# Patient Record
Sex: MALE | Race: OTHER
[De-identification: names, ages, dates, MRNs, and addresses within clinical notes are randomized per-mention and may not be internally consistent; named-entity substitution may affect disease eponyms.]

---

## 2018-03-26 ENCOUNTER — HOSPITAL ENCOUNTER (OUTPATIENT)
Dept: HOSPITAL 31 - C.ER | Age: 58
Setting detail: OBSERVATION
LOS: 1 days | Discharge: HOME | End: 2018-03-27
Attending: FAMILY MEDICINE | Admitting: FAMILY MEDICINE
Payer: COMMERCIAL

## 2018-03-26 VITALS — BODY MASS INDEX: 39.8 KG/M2

## 2018-03-26 DIAGNOSIS — Z82.49: ICD-10-CM

## 2018-03-26 DIAGNOSIS — Z87.891: ICD-10-CM

## 2018-03-26 DIAGNOSIS — Z79.4: ICD-10-CM

## 2018-03-26 DIAGNOSIS — Z86.711: ICD-10-CM

## 2018-03-26 DIAGNOSIS — Z79.82: ICD-10-CM

## 2018-03-26 DIAGNOSIS — E11.9: ICD-10-CM

## 2018-03-26 DIAGNOSIS — I10: ICD-10-CM

## 2018-03-26 DIAGNOSIS — I82.531: Primary | ICD-10-CM

## 2018-03-26 DIAGNOSIS — Z79.01: ICD-10-CM

## 2018-03-26 DIAGNOSIS — E78.00: ICD-10-CM

## 2018-03-26 LAB
ALBUMIN SERPL-MCNC: 3.7 G/DL (ref 3.5–5)
ALBUMIN/GLOB SERPL: 1.3 {RATIO} (ref 1–2.1)
ALT SERPL-CCNC: 57 U/L (ref 21–72)
APTT BLD: 29 SECONDS (ref 21–34)
AST SERPL-CCNC: 27 U/L (ref 17–59)
BASOPHILS # BLD AUTO: 0.1 K/UL (ref 0–0.2)
BASOPHILS NFR BLD: 0.8 % (ref 0–2)
BNP SERPL-MCNC: 33 PG/ML (ref 0–900)
BUN SERPL-MCNC: 11 MG/DL (ref 9–20)
CALCIUM SERPL-MCNC: 8.1 MG/DL (ref 8.6–10.4)
D DIMER: < 200 NG/MLDDU (ref 0–243)
EOSINOPHIL # BLD AUTO: 0.1 K/UL (ref 0–0.7)
EOSINOPHIL NFR BLD: 1.5 % (ref 0–4)
ERYTHROCYTE [DISTWIDTH] IN BLOOD BY AUTOMATED COUNT: 13.8 % (ref 11.5–14.5)
GFR NON-AFRICAN AMERICAN: > 60
HGB BLD-MCNC: 12.8 G/DL (ref 12–18)
INR PPP: 1
LYMPHOCYTES # BLD AUTO: 2 K/UL (ref 1–4.3)
LYMPHOCYTES NFR BLD AUTO: 28.2 % (ref 20–40)
MCH RBC QN AUTO: 28.3 PG (ref 27–31)
MCHC RBC AUTO-ENTMCNC: 33.3 G/DL (ref 33–37)
MCV RBC AUTO: 84.9 FL (ref 80–94)
MONOCYTES # BLD: 0.5 K/UL (ref 0–0.8)
MONOCYTES NFR BLD: 7.2 % (ref 0–10)
NEUTROPHILS # BLD: 4.4 K/UL (ref 1.8–7)
NEUTROPHILS NFR BLD AUTO: 62.3 % (ref 50–75)
NRBC BLD AUTO-RTO: 0 % (ref 0–2)
PLATELET # BLD: 224 K/UL (ref 130–400)
PMV BLD AUTO: 7.3 FL (ref 7.2–11.7)
PROTHROMBIN TIME: 10.6 SECONDS (ref 9.7–12.2)
RBC # BLD AUTO: 4.53 MIL/UL (ref 4.4–5.9)
WBC # BLD AUTO: 7.1 K/UL (ref 4.8–10.8)

## 2018-03-26 PROCEDURE — 82948 REAGENT STRIP/BLOOD GLUCOSE: CPT

## 2018-03-26 PROCEDURE — 85378 FIBRIN DEGRADE SEMIQUANT: CPT

## 2018-03-26 PROCEDURE — 85025 COMPLETE CBC W/AUTO DIFF WBC: CPT

## 2018-03-26 PROCEDURE — 71275 CT ANGIOGRAPHY CHEST: CPT

## 2018-03-26 PROCEDURE — 84484 ASSAY OF TROPONIN QUANT: CPT

## 2018-03-26 PROCEDURE — 84100 ASSAY OF PHOSPHORUS: CPT

## 2018-03-26 PROCEDURE — 93306 TTE W/DOPPLER COMPLETE: CPT

## 2018-03-26 PROCEDURE — 83880 ASSAY OF NATRIURETIC PEPTIDE: CPT

## 2018-03-26 PROCEDURE — 83735 ASSAY OF MAGNESIUM: CPT

## 2018-03-26 PROCEDURE — 85730 THROMBOPLASTIN TIME PARTIAL: CPT

## 2018-03-26 PROCEDURE — 80053 COMPREHEN METABOLIC PANEL: CPT

## 2018-03-26 PROCEDURE — 93970 EXTREMITY STUDY: CPT

## 2018-03-26 PROCEDURE — 83036 HEMOGLOBIN GLYCOSYLATED A1C: CPT

## 2018-03-26 PROCEDURE — 80061 LIPID PANEL: CPT

## 2018-03-26 PROCEDURE — 96372 THER/PROPH/DIAG INJ SC/IM: CPT

## 2018-03-26 PROCEDURE — 85610 PROTHROMBIN TIME: CPT

## 2018-03-26 NOTE — C.PDOC
History Of Present Illness


Pt c/o right upper back pain which he describes as "lung pain". Pt was sent in 

by his PMD with a note stating to admit pt for recurrent PE and DVTs and to 

contact Dr. ABIGAIL Martinez. 


Time Seen by Provider: 18 18:07


Chief Complaint (Nursing): Shortness Of Breath


History Per: Patient


Onset/Duration Of Symptoms: Days


Current Symptoms Are (Timing): Worse


Severity: Moderate


Associated Symptoms: Leg/Calf Pain, Ankle/Leg Swelling


Reports Recently: Treated By A Physician


Additional History Per: Prior Records





Past Medical History


Reviewed: Historical Data, Nursing Documentation, Vital Signs


Vital Signs: 





 Last Vital Signs











Temp  98.3 F   18 21:


 


Pulse  80   18 21:26


 


Resp  22   18 21:26


 


BP  121/59 L  18 21:


 


Pulse Ox  97   18 21:26














- Medical History


PMH: Diabetes, Deep Vein Thrombosis, HTN, Hypercholesterolemia, Pulmonary 

Embolism


Family History: States: Unknown Family Hx





- Social History


Hx Alcohol Use: No


Hx Substance Use: No





- Immunization History


Hx Tetanus Toxoid Vaccination: Yes


Hx Influenza Vaccination: Yes


Hx Pneumococcal Vaccination: No





Review Of Systems


Except As Marked, All Systems Reviewed And Found Negative.


Constitutional: Negative for: Fever


Cardiovascular: Positive for: Edema


Respiratory: Positive for: Shortness of Breath


Gastrointestinal: Negative for: Vomiting, Abdominal Pain


Musculoskeletal: Positive for: Back Pain, Leg Pain.  Negative for: Neck Pain


Neurological: Negative for: Weakness, Seizures, Altered Mental Status





Physical Exam





- Physical Exam


Appears: Non-toxic, No Acute Distress


Skin: Normal Color, Warm, Dry, No Rash


Head: Atraumatic, Normacephalic


Eye(s): bilateral: Normal Inspection, PERRL, EOMI


Neck: Normal ROM, Supple


Cardiovascular: Rhythm Regular


Respiratory: Normal Breath Sounds, No Accessory Muscle Use


Gastrointestinal/Abdominal: Soft, No Tenderness


Extremity: Normal ROM, Calf Tenderness, Swelling (R>L)


Pulses: Left Dorsalis Pedis: Normal, Right Dorsalis Pedis: Normal


Neurological/Psych: Oriented x3, Normal Motor, Normal Sensation





ED Course And Treatment





- Laboratory Results


Result Diagrams: 


 18 19:04





 18 19:04


Lab Interpretation: No Acute Changes


ECG: Interpreted By Me, Viewed By Me


ECG Rhythm: Sinus Rhythm, Nonspecific Changes


ECG Interpretation: No Acute Changes


Rate From EC


O2 Sat by Pulse Oximetry: 94 (on RA)


Pulse Ox Interpretation: Other (Borderline)





- CT Scan/US


  ** CTA of chest


Other Rad Studies (CT/US): Read By Radiologist, Radiology Report Reviewed


CT/US Interpretation: FINDINGS:  Limitations:  Suboptimal timing of bolus.  

Pulmonary arteries:  Chronic linear filling defects within lobar branches,.  

stable.  No definite acute pulmonary embolism.  Aorta:  No aneurysm.  No 

dissection.  Lungs:  No consolidation.  Few calcified granulomas.  Pleural space

:  No significant effusion.  No pneumothorax.  Heart:  Mild cardiomegaly.  No 

significant pericardial effusion.  Coronary.  artery calcifications.  Bones/

joints:  Multilevel anterior and lateral osteophytes.  No acute.  fracture.  

Soft tissues:  Unremarkable.  Lymph nodes:  No pathologically enlarged lymph 

nodes.  Liver:  Fatty infiltration.  Kidneys and ureters:  Few probable LEFT 

renal cysts.





- Physician Consult Information


Physician Contacted: Jose Martinez


Outcome Of Conversation: He wants pt admitted on Hospitalist service and he 

will consult.





Medical Decision Making


Medical Decision Making: 


Patient may benefit from IVC filter placement. 





Disposition


Discussed With : Viktor Amos


Comment: He accepted pt on hospitalist service.


Doctor Will See Patient In The: Hospital


Counseled Patient/Family Regarding: Studies Performed, Diagnosis





- Disposition


Disposition: HOSPITALIZED


Disposition Time: 22:44


Condition: FAIR





- Clinical Impression


Clinical Impression: 


 DVT (deep venous thrombosis)

## 2018-03-26 NOTE — CP.PCM.HP
<WillamJustina SFelix - Last Filed: 03/27/18 02:57>





History of Present Illness





- History of Present Illness


History of Present Illness: 





CC:"SOB"





HPI: 57 year old male with past medical history of HTN, DM type II, HLD, blood 

clots in the lung presents to the ER for shortness of breath.  Patient states 

he has been SOB for the past 3 days associated with pain in his lungs on his 

back.  He also states his lower extremities have been swollen for the past 18 

days and he has had some numbness on his right thigh.  He also states he has 

had a cough for the past 2 days with yellow sputum.  He states he recently 

returned from [place] about 7 days ago.  He states while he was there he was able 

to walk around without feeling short of breath at all.   He denies chest pain, 

palpitations, nausea, vomiting, fever, diarrhea or constipation.





PMD: William Levin


Past Medical History: HTN, DM type II, HLD, blood clots in the lung (diagnosed 

in January) 


Surgical History: denies


Medications: Lantus 80 units q12h; Amlodipine 5mg daily; Metoprolol XL 50mg 

daily; Aspirin 81mg daily; Enalipril 10mg daily; Enoxaprin 140mg q12h; Ventolin 


Allergies: NKDA


Family History: Mom passed of MI age 64; Dad passed of MI age 60s


Social History: quit smoking 25 years ago; smoked for 14 years about 1 pack/day

; denies alcohol or illicit drug use 


 





Present on Admission





- Present on Admission


Any Indicators Present on Admission: Yes


History of DVT/PE: Yes





Review of Systems





- Constitutional


Constitutional: absent: Chills, Fever





- EENT


Eyes: absent: Blurred Vision





- Cardiovascular


Cardiovascular: Dyspnea.  absent: Chest Pain, Palpitations





- Respiratory


Respiratory: Cough, Dyspnea, Change in Mucous Color





- Gastrointestinal


Gastrointestinal: absent: Constipation, Diarrhea, Nausea, Vomiting





- Genitourinary


Genitourinary: absent: Dysuria





- Neurological


Neurological: absent: Dizziness, Headaches





Past Patient History





- Past Social History


Smoking Status: Never Smoked





- CARDIAC


Hx Hypercholesterolemia: Yes


Hx Hypertension: Yes





- PULMONARY


Hx Pulmonary Embolism: Yes





- PSYCHIATRIC


Hx Substance Use: No





- SURGICAL HISTORY


Hx Surgeries: No





- ANESTHESIA


Hx Anesthesia: No





Meds


Allergies/Adverse Reactions: 


 Allergies











Allergy/AdvReac Type Severity Reaction Status Date / Time


 


No Known Allergies Allergy   Verified 02/23/17 10:05














Physical Exam





- Constitutional


Appears: No Acute Distress





- Head Exam


Head Exam: ATRAUMATIC, NORMAL INSPECTION





- Eye Exam


Eye Exam: EOMI, Normal appearance





- ENT Exam


ENT Exam: Mucous Membranes Moist





- Respiratory Exam


Respiratory Exam: Clear to Auscultation Bilateral, NORMAL BREATHING PATTERN.  

absent: Rales, Rhonchi, Wheezes, Stridor





- Cardiovascular Exam


Cardiovascular Exam: REGULAR RHYTHM, RRR, +S1, +S2.  absent: JVD





- GI/Abdominal Exam


GI & Abdominal Exam: Normal Bowel Sounds, Soft.  absent: Tenderness





- Extremities Exam


Extremities exam: Positive for: pedal edema (+2 bilateral lower extremity pedal 

edema ), pedal pulses present.  Negative for: calf tenderness, tenderness





- Neurological Exam


Neurological exam: Alert, CN II-XII Intact, Oriented x3





- Expanded Neurological Exam


  ** Expanded


Patient oriented to: person, place, time


Sensory exam: Lower Extremity Light Touch: Normal, Upper Extremity Light Touch: 

Normal


Neuro motor strength exam: Left Upper Extremity: 5, Right Upper Extremity: 5, 

Left Lower Extremity: 5, Right Lower Extremity: 5


Coma Scale Eye Opening: SPONTANEOUS


Coma Scale Motor Response: OBEYS COMMANDS





- Psychiatric Exam


Psychiatric exam: Normal Affect, Normal Mood





- Skin


Skin Exam: Dry, Intact, Normal Color, Warm





Results





- Vital Signs


Recent Vital Signs: 





 Last Vital Signs











Temp  98.3 F   03/26/18 21:26


 


Pulse  80   03/26/18 21:26


 


Resp  22   03/26/18 21:26


 


BP  121/59 L  03/26/18 21:26


 


Pulse Ox  94 L  03/26/18 22:45














- Labs


Result Diagrams: 


 03/26/18 19:04





 03/26/18 19:04


Labs: 





 Laboratory Results - last 24 hr











  03/26/18 03/26/18 03/26/18





  19:04 19:04 19:04


 


WBC  7.1  


 


RBC  4.53  


 


Hgb  12.8  D  


 


Hct  38.5  


 


MCV  84.9  D  


 


MCH  28.3  


 


MCHC  33.3  


 


RDW  13.8  


 


Plt Count  224  


 


MPV  7.3  


 


Neut % (Auto)  62.3  


 


Lymph % (Auto)  28.2  


 


Mono % (Auto)  7.2  


 


Eos % (Auto)  1.5  


 


Baso % (Auto)  0.8  


 


Neut # (Auto)  4.4  


 


Lymph # (Auto)  2.0  


 


Mono # (Auto)  0.5  


 


Eos # (Auto)  0.1  


 


Baso # (Auto)  0.1  


 


PT   10.6 


 


INR   1.0 


 


APTT   29 


 


D-Dimer, Quantitative   < 200 


 


Sodium    141


 


Potassium    3.9


 


Chloride    102


 


Carbon Dioxide    28


 


Anion Gap    15


 


BUN    11


 


Creatinine    0.9


 


Est GFR ( Amer)    > 60


 


Est GFR (Non-Af Amer)    > 60


 


Random Glucose    212 H


 


Calcium    8.1 L


 


Total Bilirubin    0.3


 


AST    27


 


ALT    57


 


Alkaline Phosphatase    61


 


Troponin I    < 0.0120


 


NT-Pro-B Natriuret Pep    33.0


 


Total Protein    6.6


 


Albumin    3.7


 


Globulin    2.9


 


Albumin/Globulin Ratio    1.3














Assessment & Plan





- Assessment and Plan (Free Text)


Assessment: 





1.) Shortness of breath


- Chest CT:  No definite CT evidence of acute pulmonary embolism. 


- f/u ECHO


- EKG: NSR


- Trop negative x2


- Cardiology Consult: Dr. Martinez --> help appreciated 


- Continue Ventolin prn 





2.) LE swelling


- f/u LE dopplers





3.) History of blood clots in lung


- Chest CT:  No definite CT evidence of acute pulmonary embolism. 


- Enoxaprin 140mg SC q12h





4.) History of HTN


- continue Enalapril 10mg daily


- continue Amlodipine 5mg daily 


- Continue Metoprolol 50mg daily 


- Aspirin 81mg daily


- ECHO (2/17): EF 65%


   - f/u repeat ECHO





5.) History of HLD 


- Crestor 10mg daily


- f/u lipid panel





6.) History of DM


- ACCUchecks


- ISS


- hypoglycemia protocol


- f/u hA1c





7.) Prophylaxis


- SCDs contraindication due to swelling


- pepcid 20mg daily 


- Enoxaprin 140mg SC q12h





Case Discussed with Dr. Bette Quarles PGY-1 








<Viktor Amos - Last Filed: 03/27/18 06:19>





Results





- Vital Signs


Recent Vital Signs: 





 Last Vital Signs











Temp  98.8 F   03/27/18 03:15


 


Pulse  66   03/27/18 03:15


 


Resp  20   03/27/18 03:15


 


BP  119/62   03/27/18 03:15


 


Pulse Ox  99   03/27/18 03:15














- Labs


Result Diagrams: 


 03/26/18 19:04





 03/26/18 19:04


Labs: 





 Laboratory Results - last 24 hr











  03/26/18 03/26/18 03/26/18





  19:04 19:04 19:04


 


WBC  7.1  


 


RBC  4.53  


 


Hgb  12.8  D  


 


Hct  38.5  


 


MCV  84.9  D  


 


MCH  28.3  


 


MCHC  33.3  


 


RDW  13.8  


 


Plt Count  224  


 


MPV  7.3  


 


Neut % (Auto)  62.3  


 


Lymph % (Auto)  28.2  


 


Mono % (Auto)  7.2  


 


Eos % (Auto)  1.5  


 


Baso % (Auto)  0.8  


 


Neut # (Auto)  4.4  


 


Lymph # (Auto)  2.0  


 


Mono # (Auto)  0.5  


 


Eos # (Auto)  0.1  


 


Baso # (Auto)  0.1  


 


PT   10.6 


 


INR   1.0 


 


APTT   29 


 


D-Dimer, Quantitative   < 200 


 


Sodium    141


 


Potassium    3.9


 


Chloride    102


 


Carbon Dioxide    28


 


Anion Gap    15


 


BUN    11


 


Creatinine    0.9


 


Est GFR ( Amer)    > 60


 


Est GFR (Non-Af Amer)    > 60


 


POC Glucose (mg/dL)   


 


Random Glucose    212 H


 


Calcium    8.1 L


 


Total Bilirubin    0.3


 


AST    27


 


ALT    57


 


Alkaline Phosphatase    61


 


Troponin I    < 0.0120


 


NT-Pro-B Natriuret Pep    33.0


 


Total Protein    6.6


 


Albumin    3.7


 


Globulin    2.9


 


Albumin/Globulin Ratio    1.3














  03/27/18 03/27/18





  00:55 02:04


 


WBC  


 


RBC  


 


Hgb  


 


Hct  


 


MCV  


 


MCH  


 


MCHC  


 


RDW  


 


Plt Count  


 


MPV  


 


Neut % (Auto)  


 


Lymph % (Auto)  


 


Mono % (Auto)  


 


Eos % (Auto)  


 


Baso % (Auto)  


 


Neut # (Auto)  


 


Lymph # (Auto)  


 


Mono # (Auto)  


 


Eos # (Auto)  


 


Baso # (Auto)  


 


PT  


 


INR  


 


APTT  


 


D-Dimer, Quantitative  


 


Sodium  


 


Potassium  


 


Chloride  


 


Carbon Dioxide  


 


Anion Gap  


 


BUN  


 


Creatinine  


 


Est GFR ( Amer)  


 


Est GFR (Non-Af Amer)  


 


POC Glucose (mg/dL)  227 H 


 


Random Glucose  


 


Calcium  


 


Total Bilirubin  


 


AST  


 


ALT  


 


Alkaline Phosphatase  


 


Troponin I   < 0.0120


 


NT-Pro-B Natriuret Pep  


 


Total Protein  


 


Albumin  


 


Globulin  


 


Albumin/Globulin Ratio  














Assessment & Plan





- Date & Time


Date: 03/27/18 (I have seen and examined the patient.  I agree with the 

findings and plan of care as documented by Dr. Quarles.  Patient with SOB.  

History of PEs.  Now with leg swelling.  Check lower extremity dopplers.  

Lovenox therapeutic dose.  2D Echo.  Oxygen as needed.  Monitor for acute 

changes.)


Time: 06:18





Attending/Attestation





- Attestation


I have personally seen and examined this patient.: Yes


I have fully participated in the care of the patient.: Yes


I have reviewed all pertinent clinical information: Yes

## 2018-03-26 NOTE — CT
EXAM:

  CT Angiography Chest With Intravenous Contrast



CLINICAL HISTORY:

  57 years old, male; Pain; Chest pain; Left-sided chest pain; Additional info: 

Right upper back pain, h/o pe



TECHNIQUE:

  Axial computed tomographic angiography images of the chest with intravenous 

contrast using pulmonary embolism protocol.  All CT scans at this facility use 

one or more dose reduction techniques, viz.: automated exposure control; ma/kV 

adjustment per patient size (including targeted exams where dose is matched to 

indication; i.e. head); or iterative reconstruction technique.

  MIP reconstructed images were created and reviewed.

  Coronal and sagittal reformatted images were created and reviewed.



CONTRAST:

  100 mL of visipaque 320 administered intravenously.



COMPARISON:

  CT - ANGIO CHEST PE PROTOCOL 2018-02-20 15:19



FINDINGS:

  Limitations:  Suboptimal timing of bolus.

  Pulmonary arteries:  Chronic linear filling defects within lobar branches, 

stable.  No definite acute pulmonary embolism.

  Aorta:  No aneurysm.  No dissection.

  Lungs:  No consolidation.  Few calcified granulomas.

  Pleural space:  No significant effusion.  No pneumothorax.

  Heart:  Mild cardiomegaly.  No significant pericardial effusion.  Coronary 

artery calcifications.

  Bones/joints:  Multilevel anterior and lateral osteophytes.  No acute 

fracture.

  Soft tissues:  Unremarkable.

  Lymph nodes:  No pathologically enlarged lymph nodes.

  Liver:  Fatty infiltration.

  Kidneys and ureters:  Few probable LEFT renal cysts.



IMPRESSION:     

1.  No definite CT evidence of acute pulmonary embolism.

2.  Incidental/non-acute findings are described above.

## 2018-03-27 VITALS
OXYGEN SATURATION: 96 % | HEART RATE: 73 BPM | DIASTOLIC BLOOD PRESSURE: 81 MMHG | SYSTOLIC BLOOD PRESSURE: 150 MMHG | RESPIRATION RATE: 20 BRPM

## 2018-03-27 VITALS — TEMPERATURE: 98 F

## 2018-03-27 LAB
ALBUMIN SERPL-MCNC: 3.5 G/DL (ref 3.5–5)
ALBUMIN/GLOB SERPL: 1.3 {RATIO} (ref 1–2.1)
ALT SERPL-CCNC: 52 U/L (ref 21–72)
AST SERPL-CCNC: 27 U/L (ref 17–59)
BASOPHILS # BLD AUTO: 0 K/UL (ref 0–0.2)
BASOPHILS NFR BLD: 0.7 % (ref 0–2)
BUN SERPL-MCNC: 14 MG/DL (ref 9–20)
CALCIUM SERPL-MCNC: 8.2 MG/DL (ref 8.6–10.4)
EOSINOPHIL # BLD AUTO: 0.2 K/UL (ref 0–0.7)
EOSINOPHIL NFR BLD: 2.4 % (ref 0–4)
ERYTHROCYTE [DISTWIDTH] IN BLOOD BY AUTOMATED COUNT: 14 % (ref 11.5–14.5)
GFR NON-AFRICAN AMERICAN: > 60
HDLC SERPL-MCNC: 27 MG/DL (ref 30–70)
HGB BLD-MCNC: 12.7 G/DL (ref 12–18)
LDLC SERPL-MCNC: 100 MG/DL (ref 0–129)
LYMPHOCYTES # BLD AUTO: 1.4 K/UL (ref 1–4.3)
LYMPHOCYTES NFR BLD AUTO: 20.2 % (ref 20–40)
MCH RBC QN AUTO: 29 PG (ref 27–31)
MCHC RBC AUTO-ENTMCNC: 33.6 G/DL (ref 33–37)
MCV RBC AUTO: 86.3 FL (ref 80–94)
MONOCYTES # BLD: 0.6 K/UL (ref 0–0.8)
MONOCYTES NFR BLD: 9.6 % (ref 0–10)
NEUTROPHILS # BLD: 4.6 K/UL (ref 1.8–7)
NEUTROPHILS NFR BLD AUTO: 67.1 % (ref 50–75)
NRBC BLD AUTO-RTO: 0.1 % (ref 0–2)
PLATELET # BLD: 199 K/UL (ref 130–400)
PMV BLD AUTO: 7.6 FL (ref 7.2–11.7)
RBC # BLD AUTO: 4.38 MIL/UL (ref 4.4–5.9)
WBC # BLD AUTO: 6.8 K/UL (ref 4.8–10.8)

## 2018-03-27 RX ADMIN — HUMAN INSULIN SCH UNIT: 100 INJECTION, SOLUTION SUBCUTANEOUS at 08:25

## 2018-03-27 RX ADMIN — HUMAN INSULIN SCH UNIT: 100 INJECTION, SOLUTION SUBCUTANEOUS at 17:24

## 2018-03-27 RX ADMIN — HUMAN INSULIN SCH UNIT: 100 INJECTION, SOLUTION SUBCUTANEOUS at 12:30

## 2018-03-27 NOTE — CP.PCM.DIS
Provider





- Provider


Date of Admission: 


03/26/18 22:46





Attending physician: 


Irene Hunter DO





Consults: 





Dr. Juan Casillas


Time Spent in preparation of Discharge (in minutes): 35





Hospital Course





- Lab Results


Lab Results: 


 Most Recent Lab Values











WBC  6.8 K/uL (4.8-10.8)   03/27/18  07:40    


 


RBC  4.38 Mil/uL (4.40-5.90)  L  03/27/18  07:40    


 


Hgb  12.7 g/dL (12.0-18.0)   03/27/18  07:40    


 


Hct  37.8 % (35.0-51.0)   03/27/18  07:40    


 


MCV  86.3 fL (80.0-94.0)   03/27/18  07:40    


 


MCH  29.0 pg (27.0-31.0)   03/27/18  07:40    


 


MCHC  33.6 g/dL (33.0-37.0)   03/27/18  07:40    


 


RDW  14.0 % (11.5-14.5)   03/27/18  07:40    


 


Plt Count  199 K/uL (130-400)   03/27/18  07:40    


 


MPV  7.6 fL (7.2-11.7)   03/27/18  07:40    


 


Neut % (Auto)  67.1 % (50.0-75.0)   03/27/18  07:40    


 


Lymph % (Auto)  20.2 % (20.0-40.0)   03/27/18  07:40    


 


Mono % (Auto)  9.6 % (0.0-10.0)   03/27/18  07:40    


 


Eos % (Auto)  2.4 % (0.0-4.0)   03/27/18  07:40    


 


Baso % (Auto)  0.7 % (0.0-2.0)   03/27/18  07:40    


 


Neut # (Auto)  4.6 K/uL (1.8-7.0)   03/27/18  07:40    


 


Lymph # (Auto)  1.4 K/uL (1.0-4.3)   03/27/18  07:40    


 


Mono # (Auto)  0.6 K/uL (0.0-0.8)   03/27/18  07:40    


 


Eos # (Auto)  0.2 K/uL (0.0-0.7)   03/27/18  07:40    


 


Baso # (Auto)  0.0 K/uL (0.0-0.2)   03/27/18  07:40    


 


PT  10.6 SECONDS (9.7-12.2)   03/26/18  19:04    


 


INR  1.0   03/26/18  19:04    


 


APTT  29 SECONDS (21-34)   03/26/18  19:04    


 


D-Dimer, Quantitative  < 200 ng/mlDDU (0-243)   03/26/18  19:04    


 


Sodium  138 mmol/L (132-148)   03/27/18  07:40    


 


Potassium  4.1 mmol/L (3.6-5.2)   03/27/18  07:40    


 


Chloride  101 mmol/L ()   03/27/18  07:40    


 


Carbon Dioxide  27 mmol/L (22-30)   03/27/18  07:40    


 


Anion Gap  15  (10-20)   03/27/18  07:40    


 


BUN  14 mg/dL (9-20)   03/27/18  07:40    


 


Creatinine  0.7 mg/dL (0.8-1.5)  L  03/27/18  07:40    


 


Est GFR ( Amer)  > 60   03/27/18  07:40    


 


Est GFR (Non-Af Amer)  > 60   03/27/18  07:40    


 


POC Glucose (mg/dL)  192 mg/dL ()  H  03/27/18  11:17    


 


Random Glucose  185 mg/dL ()  H  03/27/18  07:40    


 


Hemoglobin A1c  10.0 % (4.2-6.5)  H  03/27/18  07:40    


 


Calcium  8.2 mg/dl (8.6-10.4)  L  03/27/18  07:40    


 


Phosphorus  3.3 mg/dL (2.5-4.5)   03/27/18  07:40    


 


Magnesium  1.8 mg/dL (1.6-2.3)   03/27/18  07:40    


 


Total Bilirubin  0.5 mg/dL (0.2-1.3)   03/27/18  07:40    


 


AST  27 U/L (17-59)   03/27/18  07:40    


 


ALT  52 U/L (21-72)   03/27/18  07:40    


 


Alkaline Phosphatase  59 U/L ()   03/27/18  07:40    


 


Troponin I  < 0.0120 ng/mL (0.00-0.120)   03/27/18  08:11    


 


NT-Pro-B Natriuret Pep  33.0 pg/mL (0-900)   03/26/18  19:04    


 


Total Protein  6.3 g/dL (6.3-8.3)   03/27/18  07:40    


 


Albumin  3.5 g/dL (3.5-5.0)   03/27/18  07:40    


 


Globulin  2.8 gm/dL (2.2-3.9)   03/27/18  07:40    


 


Albumin/Globulin Ratio  1.3  (1.0-2.1)   03/27/18  07:40    


 


Triglycerides  133 mg/dL (0-149)   03/27/18  07:40    


 


Cholesterol  140 mg/dL (0-199)   03/27/18  07:40    


 


LDL Cholesterol Direct  100 mg/dL (0-129)   03/27/18  07:40    


 


HDL Cholesterol  27 mg/dL (30-70)  L  03/27/18  07:40    














- Hospital Course


Hospital Course: 


HPI: 57 year old male with past medical history of HTN, DM type II, HLD, blood 

clots in the lung presents to the ER for shortness of breath.  Patient states 

he has been SOB for the past 3 days associated with pain in his lungs on his 

back.  He also states his lower extremities have been swollen for the past 18 

days and he has had some numbness on his right thigh.  He also states he has 

had a cough for the past 2 days with yellow sputum.  He states he recently 

returned from [place] about 7 days ago.  He states while he was there he was able 

to walk around without feeling short of breath at all.   He denies chest pain, 

palpitations, nausea, vomiting, fever, diarrhea or constipation.





Patient had an echo, CTA negative for PE, Doppler of lower extremities show: 

right partial chronic DVT of right popliteal vein, superficial phlebitis of 

right great saphenous vein from mid calf to knee level. Left lower extremity 

shows no evidence of DVT and normal valve function. Dr. Martinez saw patient and 

advised patient to follow up with Dr. Garrido for further management. No 

further medical management needed in the hospital at this time. Patient 

discharged on antibiotics, and instructed to follow up with Dr. Garrido in one 

week for further management and follow up Echocardiogram read





- Date & Time of H&P


Date of H&P: 03/27/18


Time of H&P: 15:24





Discharge Exam





- Head Exam


Head Exam: ATRAUMATIC, NORMAL INSPECTION, NORMOCEPHALIC





- Eye Exam


Eye Exam: EOMI, Normal appearance





- ENT Exam


ENT Exam: Normal External Ear Exam





- Respiratory Exam


Respiratory Exam: NORMAL BREATHING PATTERN.  absent: Accessory Muscle Use, 

Wheezes, Respiratory Distress


Additional comments: 





patient has productive cough





- Cardiovascular Exam


Cardiovascular Exam: REGULAR RHYTHM, +S1, +S2





- GI/Abdominal Exam


GI & Abdominal Exam: Soft.  absent: Tenderness





- Extremities Exam


Extremities exam: full ROM, normal capillary refill





- Neurological Exam


Neurological exam: Alert, CN II-XII Intact, Oriented x3





- Psychiatric Exam


Psychiatric exam: Anxious (patient is anxsious and depressed because he has 

claustrophobia and was anxious during CTA), Normal Affect





- Skin


Skin Exam: Dry, Pallor, Warm





Discharge Plan





- Follow Up Plan


Condition: FAIR


Disposition: HOME/ ROUTINE

## 2018-03-27 NOTE — CP.PCM.CON
History of Present Illness





- History of Present Illness


History of Present Illness: 





Cardiology Consult Note for Dr. Juan Castrejon PGY-1





Consult for DVT/PE





HPI: Patient is a 57 year old male with past medical history that includes DM2, 

HTN, HLD, DVT and recurrent PE. Patient presented to [facility] at request 

of his PMD for complaints of back pain vs. pulmonary pain with noted history of 

recurrent DVT and pulmonary embolisms. Patient reports he has experienced his 

pulmonary pain with accompanied shortness of breath since 2018 when 

he was diagnosed with clots of his lung. He reports intermittent episodes of 

discomfort described as dull pain.  Denies pleuritic chest pain, hemoptysis, 

fever, chills. Patient indicates experiencing shortness of breath associated 

with his back pain for 3 dyas prior to arrival. He indicated that his lower 

extremities had become swollen for the past 3 weeks. He reports his right leg 

had previously swollen but prior to his current presentation he reported both 

legs being swollen. Patient indicates that he had recently returned from [place] 

about 1 week prior to arrival at [facility]. He reports that during his time in 

[place] and upon his arrival he had no difficulty with breathing or back pain. He 

did indicate that he had swollen lower extremities at this time. Patient 

reports headache at times and productive cough of yellow sputum in the last 48 

hours. Patient was admitted to medical surgical floor. In the ED a CTA of chest 

was preformed showing chronic linear defects within the lobar branches with no 

definite acute pulmonary embolism, aneurysm, dissection, effusion, or 

pneumothorax. Patient started on lovenox and home medications continued.  


 


PMH: HTN, DM type II, HLD, DVT, recurrent PE


PSH: denies


FMH: Mom  of MI age 64; Dad  of MI age 60s


SOCHHx: Tobacco: quit smoking 25 years ago; smoked for 14 years about 1 pack/day

; ETOH: Denies, ID: Denies 


ALL: NKDA


MEDS: Lantus 80 units q12h; Amlodipine 5mg daily; Metoprolol XL 50mg daily; 

Aspirin 81mg daily; Enalipril 10mg daily; Enoxaprin 140mg q12h; Ventolin





PMD: William Levin





Review of Systems





- Review of Systems


All systems: reviewed and no additional remarkable complaints except (as 

mentioned in HPI)





Past Patient History





- Past Social History


Smoking Status: Former Smoker


Alcohol: None


Drugs: Denies





- CARDIAC


Hx Hypercholesterolemia: Yes


Hx Hypertension: Yes





- PULMONARY


Hx Pulmonary Embolism: Yes





- PSYCHIATRIC


Hx Substance Use: No





- SURGICAL HISTORY


Hx Surgeries: No





- ANESTHESIA


Hx Anesthesia: No





Meds


Allergies/Adverse Reactions: 


 Allergies











Allergy/AdvReac Type Severity Reaction Status Date / Time


 


No Known Allergies Allergy   Verified 17 10:05














- Medications


Medications: 


 Current Medications





Albuterol (Ventolin Hfa 90 Mcg/Actuation (8 G))  1 puff INH RQ6 PRN


   PRN Reason: Shortness of Breath


Amlodipine Besylate (Norvasc)  5 mg PO DAILY [facility]


   Last Admin: 18 10:19 Dose:  5 mg


Aspirin (Aspirin Chewable)  81 mg PO DAILY [facility]


   Last Admin: 18 10:19 Dose:  81 mg


Dextrose (Dextrose 50% Inj)  0 ml IV STAT PRN; Protocol


   PRN Reason: Hypoglycemia Protocol


Dextrose (Glutose 15)  15 gm PO ONCE PRN; Protocol


   PRN Reason: Hypoglycemia Protocol


Enalapril Maleate (Vasotec)  10 mg PO DAILY [facility]


   Last Admin: 18 10:20 Dose:  10 mg


Enoxaparin Sodium (Lovenox)  140 mg SC Q12 [facility]


   Last Admin: 18 10:20 Dose:  140 mg


Famotidine (Pepcid)  20 mg PO DAILY [facility]


   Last Admin: 18 10:19 Dose:  20 mg


Glucagon (Glucagen Diagnostic Kit)  1 mg IM STAT PRN; Protocol


   PRN Reason: Hypoglycemia Protocol


Dextrose (Dextrose 5% In Water 1000 Ml)  1,000 mls @ 0 mls/hr IV .Q0M PRN; 

Protocol; Per Protocol


   PRN Reason: Hypoglycemia Protocol


Insulin Human Regular (Novolin R)  0 unit SC ACHS [facility]


   PRN Reason: Protocol


   Last Admin: 18 08:25 Dose:  3 unit


Metoprolol Succinate (Toprol Xl)  50 mg PO DAILY [facility]


   Last Admin: 18 10:20 Dose:  50 mg











Physical Exam





- Constitutional


Appears: Non-toxic, No Acute Distress





- Head Exam


Head Exam: ATRAUMATIC, NORMAL INSPECTION, NORMOCEPHALIC





- Eye Exam


Eye Exam: EOMI, Normal appearance, PERRL


Pupil Exam: NORMAL ACCOMODATION





- ENT Exam


ENT Exam: Mucous Membranes Moist





- Neck Exam


Neck exam: Positive for: Full Rom





- Respiratory Exam


Respiratory Exam: Clear to Auscultation Bilateral, NORMAL BREATHING PATTERN.  

absent: Rales, Rhonchi, Wheezes





- Cardiovascular Exam


Cardiovascular Exam: REGULAR RHYTHM, +S1, +S2.  absent: Clicks, Irregular Rhythm

, JVD





- GI/Abdominal Exam


GI & Abdominal Exam: Normal Bowel Sounds, Soft.  absent: Firm, Rebound





- Extremities Exam


Extremities exam: Positive for: calf tenderness (minor right vs. left), pedal 

edema (2+ right mid tib, 1+ left mid tib), pedal pulses present





- Back Exam


Back exam: paraspinal tenderness (mild upper right back ).  absent: CVA 

tenderness (L), CVA tenderness (R)





- Neurological Exam


Neurological exam: Alert, CN II-XII Intact, Normal Gait, Oriented x3


Additional comments: 





Able to move all four extremities past midline, follows simple commands, motor 

and sensory grossly intact





- Psychiatric Exam


Psychiatric exam: Normal Affect, Normal Mood





- Skin


Skin Exam: Warm





Results





- Vital Signs


Recent Vital Signs: 


 Last Vital Signs











Temp  98.0 F   18 08:00


 


Pulse  64   18 08:00


 


Resp  18   18 08:00


 


BP  143/76   18 10:20


 


Pulse Ox  97   18 08:00














- Labs


Result Diagrams: 


 18 07:40





 18 07:40


Labs: 


 Laboratory Results - last 24 hr











  18





  19:04 19:04 19:04


 


WBC  7.1  


 


RBC  4.53  


 


Hgb  12.8  D  


 


Hct  38.5  


 


MCV  84.9  D  


 


MCH  28.3  


 


MCHC  33.3  


 


RDW  13.8  


 


Plt Count  224  


 


MPV  7.3  


 


Neut % (Auto)  62.3  


 


Lymph % (Auto)  28.2  


 


Mono % (Auto)  7.2  


 


Eos % (Auto)  1.5  


 


Baso % (Auto)  0.8  


 


Neut # (Auto)  4.4  


 


Lymph # (Auto)  2.0  


 


Mono # (Auto)  0.5  


 


Eos # (Auto)  0.1  


 


Baso # (Auto)  0.1  


 


PT   10.6 


 


INR   1.0 


 


APTT   29 


 


D-Dimer, Quantitative   < 200 


 


Sodium    141


 


Potassium    3.9


 


Chloride    102


 


Carbon Dioxide    28


 


Anion Gap    15


 


BUN    11


 


Creatinine    0.9


 


Est GFR ( Amer)    > 60


 


Est GFR (Non-Af Amer)    > 60


 


POC Glucose (mg/dL)   


 


Random Glucose    212 H


 


Hemoglobin A1c   


 


Calcium    8.1 L


 


Phosphorus   


 


Magnesium   


 


Total Bilirubin    0.3


 


AST    27


 


ALT    57


 


Alkaline Phosphatase    61


 


Troponin I    < 0.0120


 


NT-Pro-B Natriuret Pep    33.0


 


Total Protein    6.6


 


Albumin    3.7


 


Globulin    2.9


 


Albumin/Globulin Ratio    1.3


 


Triglycerides   


 


Cholesterol   


 


LDL Cholesterol Direct   


 


HDL Cholesterol   














  18





  00:55 02:04 06:37


 


WBC   


 


RBC   


 


Hgb   


 


Hct   


 


MCV   


 


MCH   


 


MCHC   


 


RDW   


 


Plt Count   


 


MPV   


 


Neut % (Auto)   


 


Lymph % (Auto)   


 


Mono % (Auto)   


 


Eos % (Auto)   


 


Baso % (Auto)   


 


Neut # (Auto)   


 


Lymph # (Auto)   


 


Mono # (Auto)   


 


Eos # (Auto)   


 


Baso # (Auto)   


 


PT   


 


INR   


 


APTT   


 


D-Dimer, Quantitative   


 


Sodium   


 


Potassium   


 


Chloride   


 


Carbon Dioxide   


 


Anion Gap   


 


BUN   


 


Creatinine   


 


Est GFR ( Amer)   


 


Est GFR (Non-Af Amer)   


 


POC Glucose (mg/dL)  227 H   201 H


 


Random Glucose   


 


Hemoglobin A1c   


 


Calcium   


 


Phosphorus   


 


Magnesium   


 


Total Bilirubin   


 


AST   


 


ALT   


 


Alkaline Phosphatase   


 


Troponin I   < 0.0120 


 


NT-Pro-B Natriuret Pep   


 


Total Protein   


 


Albumin   


 


Globulin   


 


Albumin/Globulin Ratio   


 


Triglycerides   


 


Cholesterol   


 


LDL Cholesterol Direct   


 


HDL Cholesterol   














  18





  07:40 07:40 07:40


 


WBC  6.8  


 


RBC  4.38 L  


 


Hgb  12.7  


 


Hct  37.8  


 


MCV  86.3  


 


MCH  29.0  


 


MCHC  33.6  


 


RDW  14.0  


 


Plt Count  199  


 


MPV  7.6  


 


Neut % (Auto)  67.1  


 


Lymph % (Auto)  20.2  


 


Mono % (Auto)  9.6  


 


Eos % (Auto)  2.4  


 


Baso % (Auto)  0.7  


 


Neut # (Auto)  4.6  


 


Lymph # (Auto)  1.4  


 


Mono # (Auto)  0.6  


 


Eos # (Auto)  0.2  


 


Baso # (Auto)  0.0  


 


PT   


 


INR   


 


APTT   


 


D-Dimer, Quantitative   


 


Sodium   138 


 


Potassium   4.1 


 


Chloride   101 


 


Carbon Dioxide   27 


 


Anion Gap   15 


 


BUN   14 


 


Creatinine   0.7 L 


 


Est GFR ( Amer)   > 60 


 


Est GFR (Non-Af Amer)   > 60 


 


POC Glucose (mg/dL)   


 


Random Glucose   185 H 


 


Hemoglobin A1c    10.0 H


 


Calcium   8.2 L 


 


Phosphorus   3.3 


 


Magnesium   1.8 


 


Total Bilirubin   0.5 


 


AST   27 


 


ALT   52 


 


Alkaline Phosphatase   59 


 


Troponin I   


 


NT-Pro-B Natriuret Pep   


 


Total Protein   6.3 


 


Albumin   3.5 


 


Globulin   2.8 


 


Albumin/Globulin Ratio   1.3 


 


Triglycerides   133 


 


Cholesterol   140 


 


LDL Cholesterol Direct   100 


 


HDL Cholesterol   27 L 














  18





  08:11


 


WBC 


 


RBC 


 


Hgb 


 


Hct 


 


MCV 


 


MCH 


 


MCHC 


 


RDW 


 


Plt Count 


 


MPV 


 


Neut % (Auto) 


 


Lymph % (Auto) 


 


Mono % (Auto) 


 


Eos % (Auto) 


 


Baso % (Auto) 


 


Neut # (Auto) 


 


Lymph # (Auto) 


 


Mono # (Auto) 


 


Eos # (Auto) 


 


Baso # (Auto) 


 


PT 


 


INR 


 


APTT 


 


D-Dimer, Quantitative 


 


Sodium 


 


Potassium 


 


Chloride 


 


Carbon Dioxide 


 


Anion Gap 


 


BUN 


 


Creatinine 


 


Est GFR ( Amer) 


 


Est GFR (Non-Af Amer) 


 


POC Glucose (mg/dL) 


 


Random Glucose 


 


Hemoglobin A1c 


 


Calcium 


 


Phosphorus 


 


Magnesium 


 


Total Bilirubin 


 


AST 


 


ALT 


 


Alkaline Phosphatase 


 


Troponin I  < 0.0120


 


NT-Pro-B Natriuret Pep 


 


Total Protein 


 


Albumin 


 


Globulin 


 


Albumin/Globulin Ratio 


 


Triglycerides 


 


Cholesterol 


 


LDL Cholesterol Direct 


 


HDL Cholesterol 














Assessment & Plan





- Assessment and Plan (Free Text)


Assessment: 





 Patient is a 57 year old male with past medical history that includes DM2, HTN

, HLD, DVT and recurrent PE. Patient presented to [facility] at request of 

his PMD for complaints of back pain vs. pulmonary pain with noted history of 

recurrent DVT and pulmonary embolisms. Patient was admitted for concern of 

pulmonary embolism and is currently undergoing further medical evaluation and 

management. 


Plan: 





1. Hx of DVT/PE


Details: 


- Patient last seen in 2018 at [facility] for pulmonary embolism


- D-Dimer wnl/low value


- CTA (3/26/18)showing Limitations:  Suboptimal timing of bolus.  Pulmonary 

arteries:  Chronic linear filling defects within lobar branches,.  stable.  No 

definite acute pulmonary embolism.  Aorta:  No aneurysm.  No dissection.  Lungs

:  No consolidation.  Few calcified granulomas.  Pleural space:  No significant 

effusion.  No pneumothorax.  Heart:  Mild cardiomegaly.  No significant 

pericardial effusion.  Coronary.  artery calcifications.  Bones/joints:  

Multilevel anterior and lateral osteophytes.  No acute.  fracture.  Soft tissues

:  Unremarkable.  Lymph nodes:  No pathologically enlarged lymph nodes.  Liver:

  Fatty infiltration.  Kidneys and ureters:  Few probable LEFT renal cysts.


Plan:


- Follow up Echocardiogram, doppler of lower extremities


- Continue anticoagulation with lovenox 40mg BID


- Oxygen as needed





2. HTN


Details: 


- Normotensive at this time


- Chronic history


Plan: 


- Continue home medications





3. HLD


Details: 


- , , HDL 27, 


Plan: 


- Crestor 10mg Daily 





4. DM2


- Managed per primary 





Further recommendations per Dr. Juan Castrejon PGY-1





- Date & Time


Date: 18


Time: 12:07

## 2018-03-27 NOTE — CP.PCM.DIS
Provider





- Provider


Date of Admission: 


03/26/18 22:46





Attending physician: 


Irene Hunter DO





Consults: 


Dr. Martinez


Time Spent in preparation of Discharge (in minutes): 35





Hospital Course





- Lab Results


Lab Results: 


 Most Recent Lab Values











WBC  6.8 K/uL (4.8-10.8)   03/27/18  07:40    


 


RBC  4.38 Mil/uL (4.40-5.90)  L  03/27/18  07:40    


 


Hgb  12.7 g/dL (12.0-18.0)   03/27/18  07:40    


 


Hct  37.8 % (35.0-51.0)   03/27/18  07:40    


 


MCV  86.3 fL (80.0-94.0)   03/27/18  07:40    


 


MCH  29.0 pg (27.0-31.0)   03/27/18  07:40    


 


MCHC  33.6 g/dL (33.0-37.0)   03/27/18  07:40    


 


RDW  14.0 % (11.5-14.5)   03/27/18  07:40    


 


Plt Count  199 K/uL (130-400)   03/27/18  07:40    


 


MPV  7.6 fL (7.2-11.7)   03/27/18  07:40    


 


Neut % (Auto)  67.1 % (50.0-75.0)   03/27/18  07:40    


 


Lymph % (Auto)  20.2 % (20.0-40.0)   03/27/18  07:40    


 


Mono % (Auto)  9.6 % (0.0-10.0)   03/27/18  07:40    


 


Eos % (Auto)  2.4 % (0.0-4.0)   03/27/18  07:40    


 


Baso % (Auto)  0.7 % (0.0-2.0)   03/27/18  07:40    


 


Neut # (Auto)  4.6 K/uL (1.8-7.0)   03/27/18  07:40    


 


Lymph # (Auto)  1.4 K/uL (1.0-4.3)   03/27/18  07:40    


 


Mono # (Auto)  0.6 K/uL (0.0-0.8)   03/27/18  07:40    


 


Eos # (Auto)  0.2 K/uL (0.0-0.7)   03/27/18  07:40    


 


Baso # (Auto)  0.0 K/uL (0.0-0.2)   03/27/18  07:40    


 


PT  10.6 SECONDS (9.7-12.2)   03/26/18  19:04    


 


INR  1.0   03/26/18  19:04    


 


APTT  29 SECONDS (21-34)   03/26/18  19:04    


 


D-Dimer, Quantitative  < 200 ng/mlDDU (0-243)   03/26/18  19:04    


 


Sodium  138 mmol/L (132-148)   03/27/18  07:40    


 


Potassium  4.1 mmol/L (3.6-5.2)   03/27/18  07:40    


 


Chloride  101 mmol/L ()   03/27/18  07:40    


 


Carbon Dioxide  27 mmol/L (22-30)   03/27/18  07:40    


 


Anion Gap  15  (10-20)   03/27/18  07:40    


 


BUN  14 mg/dL (9-20)   03/27/18  07:40    


 


Creatinine  0.7 mg/dL (0.8-1.5)  L  03/27/18  07:40    


 


Est GFR ( Amer)  > 60   03/27/18  07:40    


 


Est GFR (Non-Af Amer)  > 60   03/27/18  07:40    


 


POC Glucose (mg/dL)  224 mg/dL ()  H  03/27/18  17:06    


 


Random Glucose  185 mg/dL ()  H  03/27/18  07:40    


 


Hemoglobin A1c  10.0 % (4.2-6.5)  H  03/27/18  07:40    


 


Calcium  8.2 mg/dl (8.6-10.4)  L  03/27/18  07:40    


 


Phosphorus  3.3 mg/dL (2.5-4.5)   03/27/18  07:40    


 


Magnesium  1.8 mg/dL (1.6-2.3)   03/27/18  07:40    


 


Total Bilirubin  0.5 mg/dL (0.2-1.3)   03/27/18  07:40    


 


AST  27 U/L (17-59)   03/27/18  07:40    


 


ALT  52 U/L (21-72)   03/27/18  07:40    


 


Alkaline Phosphatase  59 U/L ()   03/27/18  07:40    


 


Troponin I  < 0.0120 ng/mL (0.00-0.120)   03/27/18  08:11    


 


NT-Pro-B Natriuret Pep  33.0 pg/mL (0-900)   03/26/18  19:04    


 


Total Protein  6.3 g/dL (6.3-8.3)   03/27/18  07:40    


 


Albumin  3.5 g/dL (3.5-5.0)   03/27/18  07:40    


 


Globulin  2.8 gm/dL (2.2-3.9)   03/27/18  07:40    


 


Albumin/Globulin Ratio  1.3  (1.0-2.1)   03/27/18  07:40    


 


Triglycerides  133 mg/dL (0-149)   03/27/18  07:40    


 


Cholesterol  140 mg/dL (0-199)   03/27/18  07:40    


 


LDL Cholesterol Direct  100 mg/dL (0-129)   03/27/18  07:40    


 


HDL Cholesterol  27 mg/dL (30-70)  L  03/27/18  07:40    














- Hospital Course


Hospital Course: 





CC:"SOB"





HPI: 57 year old male with past medical history of HTN, DM type II, HLD, blood 

clots in the lung presents to the ER for shortness of breath.  Patient states 

he has been SOB for the past 3 days associated with pain in his lungs on his 

back.  He also states his lower extremities have been swollen for the past 18 

days and he has had some numbness on his right thigh.  He also states he has 

had a cough for the past 2 days with yellow sputum.  He states he recently 

returned from [place] about 7 days ago.  He states while he was there he was able 

to walk around without feeling short of breath at all.   He denies chest pain, 

palpitations, nausea, vomiting, fever, diarrhea or constipation.





Patient was admitted for productive cough, shortness of breath and leg edema. 

Patient has a history of PEs and had duplex of lower extremities done during 

hospital stay which showed chronic DVTs, no new DVTs noted. CTA was negative 

for Pulmonary embolism.  Echocardiogram was done  and Dr. Martinez wanted to do a 

right heart catherization 3/28, patient said that he did not want a cath done 

and decided to go home and follow up with cardiologist for further workup and 

follow up of final read of echocardiogram. 





Patient discharged on Z-narciso and told to follow up with primary care doctor for 

further management. 





- Date & Time of H&P


Date of H&P: 03/27/18


Time of H&P: 21:31





Discharge Exam





- Head Exam


Head Exam: ATRAUMATIC, NORMAL INSPECTION, NORMOCEPHALIC





- Eye Exam


Eye Exam: EOMI, Normal appearance


Pupil Exam: NORMAL ACCOMODATION, PERRL





- ENT Exam


ENT Exam: Mucous Membranes Moist





- Neck Exam


Neck exam: Full Rom





- Respiratory Exam


Respiratory Exam: NORMAL BREATHING PATTERN.  absent: UNREMARKABLE





- Cardiovascular Exam


Cardiovascular Exam: REGULAR RHYTHM, +S1, +S2.  absent: Bradycardia, Tachycardia





- GI/Abdominal Exam


GI & Abdominal Exam: Normal Bowel Sounds, Soft.  absent: Unremarkable





- Extremities Exam


Extremities exam: full ROM, normal capillary refill, pedal edema


Additional comments: 





2+ pitting edema bilaterally on anterior shin. Edema noted to 3/4 of tibia 

bilaterally





- Neurological Exam


Neurological exam: CN II-XII Intact, Oriented x3, Reflexes Normal





- Psychiatric Exam


Psychiatric exam: Normal Affect, Normal Mood





- Skin


Skin Exam: Dry, Intact, Normal Color, Warm





Discharge Plan





- Discharge Medications


Prescriptions: 


Azithromycin [Z-Narciso] 250 mg PO DAILY #6 tab


Promethazine/Codeine [Phenergan/Codeine Oral Syrup] 5 ml PO Q4 PRN #1 udc


 PRN Reason: Cough





- Follow Up Plan


Condition: FAIR


Disposition: HOME/ ROUTINE


Additional Instructions: 


Follow up with Dr. Garrido in one week for follow up echocardiogram and 

further workup as necessary


take antibiotics as directed.

## 2018-03-27 NOTE — VASCLAB
PROCEDURE:  Lower Extremity Venous Duplex Exam.



HISTORY:

Lower extremity swelling 



PRIORS:

2/22/2018, abnormal.



TECHNIQUE:

Bilateral common femoral, femoral, popliteal and posterior tibial, 

peroneal and great saphenous veins were evaluated. Flow was assessed 

with color Doppler, compressibility, assessment of phasic flow and 

augmentation response.



Report prepared by   Jim Bartholomew, SEGUN, RVT



FINDINGS:



RIGHT:

1. Common Femoral Vein: 



1.1. Compressibility - Fully compressible: Thrombus -  None : Flow - 

Phasic: Augmentation -Normal: Reflux - None.



2. Femoral Vein:



2.1. Compressibility - Fully compressible: Thrombus -  None : Flow - 

Phasic: Augmentation -Normal: Reflux - None.



3. Popliteal Vein: 



3.1. Compressibility - Partial: Thrombus - Chronic :  Flow - Reduced 

: Augmentation -Normal: Reflux - None.



4. Posterior Tibial Vein: 



4.1. Compressibility - Fully compressible: Thrombus -  None: Flow - 

Phasic: Augmentation -Normal: Reflux - None.



5. Peroneal Vein:



5.1. Compressibility - Fully compressible: Thrombus -  None: Flow - 

Phasic: Augmentation -Normal: Reflux - None.



6. Great Saphenous Vein:

6.1. Compressibility - Partial: Thrombus - Chronic: Flow - Phasic: 

Augmentation - Normal: Reflux - None.





LEFT:

1. Common Femoral Vein:



1.1.  Compressibility - Fully compressible: Thrombus -  None: Flow - 

Phasic: Augmentation -Normal: Reflux - None.



2. Femoral Vein:



2.1.  Compressibility - Fully compressible: Thrombus -  None: Flow - 

Phasic: Augmentation -Normal: Reflux - None.



3. Popliteal Vein:



3.1.  Compressibility - Fully compressible: Thrombus -  None : Flow - 

Phasic: Augmentation -Normal: Reflux - None.



4. Posterior Tibial Vein:



4.1.  Compressibility - Fully compressible: Thrombus -  None: Flow - 

Phasic: Augmentation -Normal: Reflux - None.



5. Peroneal Vein:



5.1.  Compressibility - Fully compressible: Thrombus -  None: Flow - 

Phasic: Augmentation -Normal: Reflux - None.



6. Great Saphenous Vein:

6.1.  Compressibility - Fully compressible: Thrombus -  None: Flow - 

Phasic: Augmentation - Normal: Reflux - None.





OTHER FINDINGS:  Right: None significant.



Left: None significant.



IMPRESSION:

Right: 



Partial chronic deep vein thrombosis of the right popliteal vein, 

with reduced phasicity. Superficial phlebitis of the right great 

saphenous vein, from mid calf to knee level.



Left: 



No evidence of deep or superficial vein thrombosis of the left lower 

extremity. Normal valve function noted of the left side.
parent

## 2018-03-28 NOTE — CARD
--------------- APPROVED REPORT --------------





EKG Measurement

Heart [id number]

MO [id number]

[id number]

[id number]

[id number]



<Conclusion>

Normal sinus rhythm

Normal ECG